# Patient Record
Sex: FEMALE | Race: BLACK OR AFRICAN AMERICAN | URBAN - METROPOLITAN AREA
[De-identification: names, ages, dates, MRNs, and addresses within clinical notes are randomized per-mention and may not be internally consistent; named-entity substitution may affect disease eponyms.]

---

## 2022-07-15 ENCOUNTER — HOSPITAL ENCOUNTER (OUTPATIENT)
Dept: LAB | Age: 32
Discharge: HOME | End: 2022-07-15
Payer: COMMERCIAL

## 2022-07-15 ENCOUNTER — OFFICE VISIT (OUTPATIENT)
Dept: SURGERY | Age: 32
End: 2022-07-15
Payer: COMMERCIAL

## 2022-07-15 VITALS
SYSTOLIC BLOOD PRESSURE: 126 MMHG | OXYGEN SATURATION: 98 % | TEMPERATURE: 97.3 F | HEART RATE: 105 BPM | RESPIRATION RATE: 24 BRPM | BODY MASS INDEX: 51.91 KG/M2 | HEIGHT: 63 IN | WEIGHT: 293 LBS | DIASTOLIC BLOOD PRESSURE: 84 MMHG

## 2022-07-15 DIAGNOSIS — E66.01 MORBID OBESITY WITH BMI OF 70 AND OVER, ADULT (HCC): Primary | ICD-10-CM

## 2022-07-15 DIAGNOSIS — E66.01 MORBID OBESITY WITH BMI OF 70 AND OVER, ADULT (HCC): ICD-10-CM

## 2022-07-15 DIAGNOSIS — E66.01 MORBID OBESITY (HCC): Primary | ICD-10-CM

## 2022-07-15 PROCEDURE — 83013 H PYLORI (C-13) BREATH: CPT

## 2022-07-15 PROCEDURE — 99205 OFFICE O/P NEW HI 60 MIN: CPT | Performed by: SURGERY

## 2022-07-15 NOTE — PROGRESS NOTES
Steffen Singh is a 27 y.o. female  Chief Complaint   Patient presents with    Advice Only     confrimed video     Pt ID confirmed    Visit Vitals  /84   Pulse (!) 105   Temp 97.3 °F (36.3 °C)   Resp 24   Ht 5' 3\" (1.6 m)   Wt (!) 514 lb (233.1 kg)   SpO2 98%   BMI 91.05 kg/m²     Ms. Han Powell has been given the following recommendations today due to her elevated BP reading: referred to Alternative/PCP   Pt ID confirmed    Weight Loss Metrics 7/15/2022 7/15/2022   Pre op / Initial Wt 514 -   Today's Wt - 514 lb   BMI - 91.05 kg/m2   Ideal Body Wt 128 -   Excess Body Wt 386 -   Goal Wt 205 -   Wt loss to date 0 -   % Wt Loss 0 -   80% .8 -       Body mass index is 91.05 kg/m². Claudia Brody

## 2022-07-16 LAB — UREA BREATH TEST QL: NEGATIVE

## 2022-07-21 ENCOUNTER — HOSPITAL ENCOUNTER (OUTPATIENT)
Dept: BARIATRICS/WEIGHT MGMT | Age: 32
Discharge: HOME | End: 2022-07-21

## 2022-07-21 ENCOUNTER — DOCUMENTATION ONLY (OUTPATIENT)
Dept: BARIATRICS/WEIGHT MGMT | Age: 32
End: 2022-07-21

## 2022-07-21 NOTE — PROGRESS NOTES
[address] [place] [address] [facility], [address]    Patient's Name: Kinga Olson   Age: 27 y.o. YOB: 1990   Sex: female    Date:   7/21/2022    Insurance:              Session: 1 of 6  Revision:     Surgeon:  Dr. Edmond Leach    Height: 5 f 3   Weight:    514      Lbs. BMI:    Pounds Lost since last month: 0                 Pounds Gained since last month: 0    Starting Weight: 514     Previous Months Weight: 514  Overall Pounds Lost: 0   Overall Pounds Gained: 0      Do you smoke? None    Alcohol intake:  Number of drinks at a time:  None  Number of times a week: None    Patient has not been to a support group. Class Guidelines    Guidelines are reviewed with patient at the start of every class. 1. Patient understands that weight loss trial classes must be consecutive. Patient understands if they miss a class, it is their responsibility to contact me to reschedule class. I will reach out to patient after their first no show. 2.  Patient understands the expectations that weight maintenance/weight loss is expected during the classes. Failure to demonstrate changes may result in one extra month of weight loss trial, followed by going back to see the surgeon. Patient understands that they CAN NOT gain any weight during the weight loss trial.  Gaining weight will result in extra classes. 3. Patient is also instructed to be doing their labs, blood work, psych visit, support group and any other test that the surgeon has used while they are working on their weight loss trial.  4.  Patient was instructed to bring their blue binder to every class and appointment. Other Pertinent Information:     Changes Made Since Last Class: Cut out carbohydrates and doing more meal planning    Eating Habits and Behaviors    Today in class, we started talking about the key diet principles. We first focused on stopping liquid calories. Patient was also educated on carbohydrates. Patient was instructed to start cutting out bread, rice, and pasta from the diet and start focusing more on meat and vegetables. I then gave a power point, which focused on Label Reading. In class, I gave patients a labels and we worked through a series of questions to help patients have a better understanding of label reading. Patient was instructed to review the serving size. Patient was encouraged to focus on protein and carbohydrates. We also did a few label reading activities to help the patient become more familiar with label reading. Patient's current diet habits include: Patient's current diet habits include: Patient is eating 1-2 meals a day. Patient states their portion sizes are regular plate. I have encouraged patient to use a smaller plate and fill up on water before meals to help cut down on portions. Patient is eating fast food: every day. Carry out intake is: 2 x a week. Sit down restaurant intake is: 2 x a week. Patient's is eating bread, rice, pasta, cereal, and other carbohydrates 2-3 x a day. Patient is snacking on chips, muffins, snack cakes. This is being done 2-3 times a day. Patient's sweet intake is every day. I have talked about the importance of getting into the habit now of cutting the sweets out. Fluid intake:  32 ounces of water, crystal light, unsweetened tea. 12 ounces of soda 1 x a week, 12 ounces of sweet tea 1 x a week,  ounces of fruit juice, 0 ounces of caffeine. Patient is encouraged not to drink calories and stick to non-carbonated, non-caffeine, sugar free drinks. The goal is 64 ounces of fluid per day. Physical Activity/Exercise    Comments: We talked about exercise. Patient was given reasons of why exercise is so important and how that can help with their long-term success. I have encouraged patient to get a support system to help with the activity.     Currently for activity, patient is not doing anything, but plans on increasing her walking. Behavior Modification       Comments:  Behavior modifications were reinforced. This included not eating in front of the TV, which could lead to bigger portions and eating when one is not hungry. We also talked about the importance of eating 3 meals per day. Patient was encouraged to food journal to keep their daily carbohydrates less than 30 grams per meal.      Goals that patient wants to work on includes:  1. Less eating out  2.  Increase movement      Manuel Keyes [address]  7/21/2022

## 2022-08-17 ENCOUNTER — DOCUMENTATION ONLY (OUTPATIENT)
Dept: BARIATRICS/WEIGHT MGMT | Age: 32
End: 2022-08-17

## 2022-08-17 ENCOUNTER — HOSPITAL ENCOUNTER (OUTPATIENT)
Dept: BARIATRICS/WEIGHT MGMT | Age: 32
Discharge: HOME | End: 2022-08-17

## 2022-08-17 NOTE — PROGRESS NOTES
[address] [place] [address] [facility], [address]    Patient's Name: Reyes Burroughs   Age: 27 y.o. YOB: 1990   Sex: female        Insurance:              Session: 2 of  6  Surgeon:  Dr. Panda Estrella    Height: 5 f 3   Current Weight:    500      Lbs. BMI:    Pounds Lost since last month: 14                 Pounds Gained since last month: 0      Starting Weight: 514     Previous Months Weight: 514  Overall Pounds Lost: 14   Overall Pounds Gained: 0    Do you smoke? Nnoe    Alcohol intake:  Number of drinks at a time:  None  Number of times a week: None    Class Guidelines    Guidelines are reviewed with patient at the start of every class. 1. Patient understands that weight loss trial classes must be consecutive. Patient understands if they miss a class, it is their responsibility to contact me to reschedule class. I will reach out to patient after their first no show. 2.  Patient understands the expectations that weight maintenance/weight loss is expected during the classes. Failure to demonstrate changes may result in one extra month of weight loss trial, followed by going back to see the surgeon. Patient understands that they CAN NOT gain any weight during the weight loss trial.  Gaining weight will result in extra classes. 3. Patient is also instructed to be doing their labs, blood work, psych visit, support group and any other test that the surgeon has used while they are working on their weight loss trial.  4.  Patient was instructed to bring their blue binder to every class and appointment. Other Pertinent Information:     Changes Made Since Last Class: Less carbohydrates. No fried food. Less fast food    Eating Habits and Behaviors    Today in class, we reviewed the key diet principles. I have talked to patient about pushing the fluid and working towards 64 ounces per day.   We focused on following a low-calorie diet.  Patient was instructed to count their carbohydrates and try to keep their daily intake under 75 grams per day and try to keep their daily protein at 80 grams per day. Patient was given examples of carbohydrates in starches. Patient was encouraged to focus on meat and vegetables and begin cutting carbohydrates out. We talked about foods that are protein-based and how to incorporate those into their meals. I also reviewed with patient the importance of eating 3 meals per day and suggestions were made for breakfast items. Patient's current diet habits include: Patient is eating 2-3 meals per day. Meals focus on: protein and vegetables. Patient is encouraged to fill up on protein first, then vegetables, and work on cutting back on the carbohydrates. Portions are: smaller. Patient is encouraged to use a smaller plate to help cut down on portion sizes. Patient is eating fast food: 1 times a week. Patient is eating carry out: 1 times a week. Patient is eating at a sit down restaurant: 1 times a week. Patient is eating bread, rice, and pasta:  1 x a month. Patient is snacking on yogurt or rice cakes, 2 x a week. Patient is eating sweets: nont. Patient is drinking 48 ounces of water, 0 ounces of soda, 0 ounces of sweet tea, 0 ounces of fruit juices, 0 ounces of caffeine. Patient is encouraged to continue to cut out liquid calories and aim for achieving 64 ounces of fluid per day. Physical Activity/Exercise    Comments: We talked about exercise. Patient was given reasons of why exercise is so important and how that can help with their long-term success. I have encouraged patient to get a support system to help with the activity. For activity, patient is sometimes walking. We have talked about goals, which include starting off walking and building upon that. Patient is also encouraged to add in some light weights to get some strength training.      Behavior Modification       Comments: During today's lesson, I gave a presentation called The 100-200 Calorie \"Mindless Margin. \"  The goal is to make modest daily 100-200 calorie reductions in certain things that the body won't notice. One, 100-200 calorie change and would will look 10-20 pounds in one year. An example could be cutting soda. Patient was given a check off list and was encouraged to come up with 1-3 100 calories changes they could make. The check off list is a daily tracker to see if these goals are being met. Goals that patient wants to work on includes:  1. Be more active  2.        Manuel Roman 87 RD  8/17/2022

## 2022-09-21 ENCOUNTER — HOSPITAL ENCOUNTER (OUTPATIENT)
Dept: BARIATRICS/WEIGHT MGMT | Age: 32
Discharge: HOME | End: 2022-09-21

## 2022-09-21 ENCOUNTER — DOCUMENTATION ONLY (OUTPATIENT)
Dept: BARIATRICS/WEIGHT MGMT | Age: 32
End: 2022-09-21

## 2022-09-21 NOTE — PROGRESS NOTES
[address] [facility], [address]    Patient's Name: Lula Vu   Age: 27 y.o. YOB: 1990   Sex: female      Insurance:              Session: 3 of  6  Surgeon:  Dr. Tania Vaca    Height: 5 f 3   Weight:    500      Lbs. BMI:    Pounds Lost since last month: 0                Pounds Gained since last month: 0    Starting Weight: 514     Previous Months Weight: 500  Overall Pounds Lost: 14   Overall Pounds Gained: 0    Smoking:  None    Alcohol intake:  Number of drinks at a time:  None  Number of times a week: None    Patient has attended the required bariatric support group. Class Guidelines    Guidelines are reviewed with patient at the start of every class. 1. Patient understands that weight loss trial classes must be consecutive. Patient understands if they miss a class, it is their responsibility to contact me to reschedule class. I will reach out to patient after their first no show. 2.  Patient understands the expectations that weight maintenance/weight loss is expected during the classes. Failure to demonstrate changes may result in one extra month of weight loss trial, followed by going back to see the surgeon. 3. Patient is also instructed to be doing their labs, blood work, psych visit, support group and any other test that the surgeon has used while they are working on their weight loss trial.    Other Pertinent Information:     Changes Made Since Last Class: Trying to eat 3 x a day    Eating Habits and Behaviors      During today's class, we continued to focus on the key diet principles. Patient was instructed to follow a low carbohydrate diet, focusing on meat and vegetables. Patient was instructed to stop liquid calories and aim for 64 ounces of water per day.  We focused on focusing in on bigger problem areas to start making changes to, such as reducing fast food intake, reducing carbonated beverages/soda intake, decreasing carbohydrates intake daily, etc. We reviewed protein shakes and high protein yogurts to chose, as well. During today's class, we also talked about how to read a label. Patient was given information on: The benefits of reading a label, which allowed one to compare the nutritional value of similar products and make healthy food decisions. The ingredient list, which can help to determine if the food is heathy or something that fits into the diet. The importance of reading the serving size and making sure to apply that to the portion size that they are consuming. Patient was also educated on carbohydrates. I talked to patient about: The function of carbohydrates. Foods that carbohydrate-heavy. Patient was given the guidelines to keep their carbohydrates less than 75 grams per day in the pre-op phase. Patient was also given ideas of low carb swaps, which include zucchini noodles, spaghetti squash, or cauliflower rice. Lower carbohydrate fruit options were discussed. Discussed lower carb swaps to use instead of potato chips. Patient's dietary habits include: Patient is eating 2.5-3 meals per day. Meals are made up of protein and vegetables. Portions are:  smaller. Patient's frequency of fast food is: 2 x a week  Patient's frequency of carry out is: None  Patient's intake of sit down: None  Patient is eating carbohydrates: once a day  Patient is snacking on pork rinds, pickles, sugar free yogurt. This is being done: 2-3 x a day. I have talked to patient about some lower carbohydrate snack choices that focused more protein. Patient is drinking 48 ounces of fluid per day. Fluid intake is make up of: water, protein shakes. Patient is encouraged to focus on non-caloric, non-caffeine, non-carbonated liquids. Physical Activity/Exercise     Comments:     Currently for exercise, patient is trying to walk more.   I have talked to patient about some suggestions to start an exercise routine. Patient is encouraged to purchase a pedometer and use this to track her steps. I have made some suggestions to patient of ways to incorporate exercise in with a busy lifestyle. We also talked about You Tube videos that can be used for an exercise routine. Behavior Modification  Comments:  Behavior modifications were discussed with the patient. Some of those behavior modifications include:  Emphasized the importance of eating slowly, not eating and drinking meals at the same time. Taking 20-30 minutes to eat a meal.  Patient is taking 20 minutes to eat a meal.  I have encouraged patient to follow journal, which may be done by paper or tracking it an maxi, such as My Fitness Pal or [employer] [place]. Patient understands the importance of following through with these behaviors following surgery to aid in long term weight loss. Tips and advice were given on how to start implementing these into the patient's life. Goal patient has set for next month:  Eliminate carbohydrates completely   2.       Manuel Gardner [address] RD  9/21/2022

## 2022-10-11 ENCOUNTER — OFFICE VISIT (OUTPATIENT)
Dept: SURGERY | Age: 32
End: 2022-10-11
Payer: COMMERCIAL

## 2022-10-11 ENCOUNTER — HOSPITAL ENCOUNTER (OUTPATIENT)
Dept: GENERAL RADIOLOGY | Age: 32
Discharge: HOME | End: 2022-10-11
Payer: COMMERCIAL

## 2022-10-11 ENCOUNTER — HOSPITAL ENCOUNTER (OUTPATIENT)
Dept: LAB | Age: 32
Discharge: HOME | End: 2022-10-11
Payer: COMMERCIAL

## 2022-10-11 VITALS
DIASTOLIC BLOOD PRESSURE: 80 MMHG | SYSTOLIC BLOOD PRESSURE: 130 MMHG | BODY MASS INDEX: 51.91 KG/M2 | HEIGHT: 63 IN | TEMPERATURE: 98.2 F | OXYGEN SATURATION: 98 % | RESPIRATION RATE: 18 BRPM | HEART RATE: 94 BPM | WEIGHT: 293 LBS

## 2022-10-11 DIAGNOSIS — E66.01 CLASS 3 SEVERE OBESITY DUE TO EXCESS CALORIES WITHOUT SERIOUS COMORBIDITY WITH BODY MASS INDEX (BMI) GREATER THAN OR EQUAL TO 70 IN ADULT (HCC): ICD-10-CM

## 2022-10-11 DIAGNOSIS — E66.01 MORBID OBESITY (HCC): ICD-10-CM

## 2022-10-11 DIAGNOSIS — Z71.89 ENCOUNTER FOR PRE-BARIATRIC SURGERY COUNSELING AND EDUCATION: Primary | ICD-10-CM

## 2022-10-11 LAB — SENTARA SPECIMEN COL,SENBCF: NORMAL

## 2022-10-11 PROCEDURE — 71046 X-RAY EXAM CHEST 2 VIEWS: CPT

## 2022-10-11 PROCEDURE — 99001 SPECIMEN HANDLING PT-LAB: CPT

## 2022-10-11 PROCEDURE — 99213 OFFICE O/P EST LOW 20 MIN: CPT | Performed by: REGISTERED NURSE

## 2022-10-11 PROCEDURE — 93005 ELECTROCARDIOGRAM TRACING: CPT

## 2022-10-11 NOTE — PROGRESS NOTES
Bariatric Preoperative Progress Note    Chief Complaint   Patient presents with    Morbid Obesity     Patient presents today for bariatric midtrial.     Subjective:     Jeromy White is a 27 y.o. female who presents today for follow up of her candidacy for bariatric surgery. Since last seen, Jeromy White has been working through our bariatric program towards gastric bypass with Dr. Aminata Palomares. Consult Weight: 514 lbs  Today's Weight: 502 lbs    Past Medical History:   Diagnosis Date    Morbid obesity ([address]        Past Surgical History:   Procedure Laterality Date    HX HEENT      adenoid removal            No Known Allergies    ROS:  Review of Systems   Constitutional:  Positive for weight loss. Respiratory:  Negative for cough and shortness of breath. Cardiovascular:  Negative for chest pain and palpitations. Gastrointestinal:  Negative for abdominal pain. Objective:     Physical Exam:  Visit Vitals  /80 (BP 1 Location: Left arm, BP Patient Position: Sitting, BP Cuff Size: Other (Comment)) Comment (BP Cuff Size): wrist cuff   Pulse 94   Temp 98.2 °F (36.8 °C) (Temporal)   Resp 18   Ht 5' 3\" (1.6 m)   Wt (!) 227.7 kg (502 lb)   SpO2 98%   BMI 88.93 kg/m²       Physical Exam  Constitutional:       Appearance: She is obese. HENT:      Head: Normocephalic and atraumatic. Eyes:      Pupils: Pupils are equal, round, and reactive to light. Cardiovascular:      Rate and Rhythm: Normal rate and regular rhythm. Pulmonary:      Effort: Pulmonary effort is normal.      Breath sounds: Normal breath sounds. Neurological:      Mental Status: She is alert and oriented to person, place, and time. Psychiatric:         Mood and Affect: Mood normal.         Behavior: Behavior normal.         Thought Content:  Thought content normal.       Studies to date:    Labs: Pending  H pylori 7/15/2022: Negative    EKG 10/11/2022: Normal sinus rhythm, Normal ECG     CXR 10/11/2022: No radiographic evidence of acute cardiopulmonary process. PAP: Pending    Nutritional evaluation: Completed 3 of 6 with Jamarcus Nathan RD    Psychiatric evaluation: Pending approval, follow up in Nov 2022 with Dr. Berto Vu: Done    Additional Evaluations:     Assessment:   iKrill Medellin is a 27 y.o. female who is progressing through the bariatric preoperative evaluation. At this time, she is an appropriate candidate for weight loss surgery pending below requirements. Plan:   -Complete remainder of preop evaluation including WLT classes, PAP, psychiatric clearance, and lab work. -Patient communicates understanding that the expectation is to lose or maintain weight during WLT. Weight gain may result in delay or cancellation of surgery.   -Follow up once she has completed entirety of weight loss workup to determine next steps. Quintella Mortimer C. Carbullido, CT-BC    I have spent 25 minutes reviewing previous notes, test results, and face to face with the patient discussing the treatment plan as well as documenting on the day of the visit.

## 2022-10-12 LAB
25(OH)D3 SERPL-MCNC: 35.6 NG/ML (ref 32–100)
A-G RATIO,AGRAT: 1.1 RATIO (ref 1.1–2.6)
ALBUMIN SERPL-MCNC: 4.1 G/DL (ref 3.5–5)
ALP SERPL-CCNC: 122 U/L (ref 25–115)
ALT SERPL-CCNC: 13 U/L (ref 5–40)
ANION GAP SERPL CALC-SCNC: 12 MMOL/L (ref 3–15)
AST SERPL W P-5'-P-CCNC: 12 U/L (ref 10–37)
ATRIAL RATE: 98 BPM
BACTERIA,BACTU: PRESENT
BILIRUB SERPL-MCNC: 0.3 MG/DL (ref 0.2–1.2)
BILIRUB UR QL: NEGATIVE
BUN SERPL-MCNC: 9 MG/DL (ref 6–22)
CA OXALATE CRYSTALS,CAOXC: PRESENT
CALCIUM SERPL-MCNC: 9.5 MG/DL (ref 8.4–10.5)
CALCULATED P AXIS, ECG09: 69 DEGREES
CALCULATED R AXIS, ECG10: 21 DEGREES
CALCULATED T AXIS, ECG11: 40 DEGREES
CHLORIDE SERPL-SCNC: 104 MMOL/L (ref 98–110)
CHOLEST SERPL-MCNC: 175 MG/DL (ref 110–200)
CLARITY: ABNORMAL
CO2 SERPL-SCNC: 25 MMOL/L (ref 20–32)
COLOR UR: YELLOW
CREAT SERPL-MCNC: 0.6 MG/DL (ref 0.5–1.2)
DIAGNOSIS, 93000: NORMAL
EPITHELIAL,EPSU: ABNORMAL /HPF (ref 0–2)
FE % SATURATION,PSAT: 7 % (ref 20–50)
FERRITIN SERPL-MCNC: 27 NG/ML (ref 10–291)
FOLATE,FOL: >20 NG/ML
GLOBULIN,GLOB: 3.6 G/DL (ref 2–4)
GLOMERULAR FILTRATION RATE: >60 ML/MIN/1.73 SQ.M.
GLUCOSE SERPL-MCNC: 86 MG/DL (ref 70–99)
GLUCOSE UR QL: NEGATIVE MG/DL
HDLC SERPL-MCNC: 38 MG/DL
HDLC SERPL-MCNC: 4.6 MG/DL (ref 0–5)
HGB UR QL STRIP: NEGATIVE
HYLINE CAST, 6014: ABNORMAL /LPF (ref 0–2)
IRON,IRN: 31 MCG/DL (ref 30–160)
KETONES UR QL STRIP.AUTO: NEGATIVE MG/DL
LDL/HDL RATIO,LDHD: 3.2
LDLC SERPL CALC-MCNC: 121 MG/DL (ref 50–99)
LEUKOCYTE ESTERASE: ABNORMAL
NITRITE UR QL STRIP.AUTO: NEGATIVE
NON-HDL CHOLESTEROL, 011976: 137 MG/DL
P-R INTERVAL, ECG05: 130 MS
PH UR STRIP: 5.5 PH (ref 5–8)
POTASSIUM SERPL-SCNC: 4.4 MMOL/L (ref 3.5–5.5)
PROT SERPL-MCNC: 7.7 G/DL (ref 6.4–8.3)
PROT UR QL STRIP: NEGATIVE MG/DL
Q-T INTERVAL, ECG07: 354 MS
QRS DURATION, ECG06: 78 MS
QTC CALCULATION (BEZET), ECG08: 451 MS
RBC #/AREA URNS HPF: ABNORMAL /HPF
SODIUM SERPL-SCNC: 141 MMOL/L (ref 133–145)
SP GR UR: 1.02 (ref 1–1.03)
TIBC,TIBC: 433 MCG/DL (ref 228–428)
TRIGL SERPL-MCNC: 81 MG/DL (ref 40–149)
TSH SERPL DL<=0.005 MIU/L-ACNC: 2.43 MCU/ML (ref 0.27–4.2)
UIBC SERPL-MCNC: 402 MCG/DL (ref 110–370)
UROBILINOGEN UR STRIP-MCNC: 0.2 MG/DL
VENTRICULAR RATE, ECG03: 98 BPM
VIT B12 SERPL-MCNC: >2000 PG/ML (ref 211–911)
VLDLC SERPL CALC-MCNC: 16 MG/DL (ref 8–30)
WBC URNS QL MICRO: ABNORMAL /HPF (ref 0–5)

## 2022-10-28 ENCOUNTER — DOCUMENTATION ONLY (OUTPATIENT)
Dept: BARIATRICS/WEIGHT MGMT | Age: 32
End: 2022-10-28

## 2022-10-28 ENCOUNTER — HOSPITAL ENCOUNTER (OUTPATIENT)
Dept: BARIATRICS/WEIGHT MGMT | Age: 32
Discharge: HOME | End: 2022-10-28

## 2022-10-28 NOTE — PROGRESS NOTES
[address] [facility], [address]    Patient's Name: Nova Toribio   Age: 27 y.o. YOB: 1990   Sex: female      Insurance:              Session: 4 of  6  Surgeon:  Dr. Wojciech Retana    Height: 5 f 3   Weight:    502      Lbs. BMI:    Pounds Lost since last month: 0                 Pounds Gained since last month: 2    Starting Weight: 514     Previous Months Weight: 500  Overall Pounds Lost: 12   Overall Pounds Gained: 0    Patient has attended a support group meeting. Do you smoke? None    Alcohol intake:  Number of drinks at a time:  None  Number of times a week: None    Class Guidelines    Guidelines are reviewed with patient at the start of every class. 1. Patient understands that weight loss trial classes must be consecutive. Patient understands if they miss a class, it is their responsibility to contact me to reschedule class. I will reach out to patient after their first no show. 2.  Patient understands the expectations that weight maintenance/weight loss is expected during the classes. Failure to demonstrate changes may result in one extra month of weight loss trial, followed by going back to see the surgeon. 3. Patient is also instructed to be doing their labs, blood work, psych visit, support group and any other test that the surgeon has used while they are working on their weight loss trial.  4. Patient is instructed to bring their education binder to all classes. Changes Made Since Last Class: Doing better at eating 3 meals per day    Eating Habits and Behaviors      Today we reviewed key diet principles. We talked about patient following a low calorie/low carbohydrate diet while they are in weight loss trials. To achieve this, patient is encouraged to avoid liquid calories, including alcohol, soda, sweet tea, and fruit juices.    Patient can cut carbohydrates by trying to stick to meat and vegetables. Patient can also eat eggs, cheese, and good fat, while trying to eliminate starches, such as pasta, rice, crackers, chips, popcorn. I also gave a power point that included 21 Ways to Stay on Track with a Healthy Lifestyle. Some of the food-related suggestions included drinking plenty of water or calorie-free beverages prior to their meal.  Patient is encouraged to to fill up on protein first, which is the ultimate fill-me up food. We talked about the importance of eating breakfast and the effects that can happen if one skips meals, which includes eating larger portions, snacking more, and decreasing their metabolism. With the suggestions in the power point, patient will be able to decrease their calories and carbohydrate intake. Patient's dietary habits include:    - Patient is eating 3 meals per day. I have emphasized the importance of trying to eat within 1 hour of waking and having a cut off time in the evening. Addressed to patient that the focus should be on protein and vegetables. Protein will help to burn more calories and keep them mathur throughout the day. We talked in class about the importance of planning ahead and trying to do more meal prep at home, so intake of eating out can be decreased. Patient is eating carbohydrates: few times a week  Patient was instructed to cut out starches and keep under 75 grams of carbohydrates per day. Reviewed what portions of carbohydrates are  Patient is snacking on pickles. This is being done: 3 x  a day. I have talked to patient about some lower carbohydrate snack choices that focused more protein. Patient's sweet intake is: 1 x every other week. We talked about label reading and cutting out simple sugar. Fluid intake is make up of: water. I have encouraged patient to cut out liquid calories and focus on non-caloric, non-carbonated drinks. Physical Activity/Exercise    Comments:    We talked about the importance of establishing a work out routine. Patient is currently not doing anything for activity. Goals have been set. Behavior Modification       Comments:   Some of the behavior tips that were included in the power point, include being choosy about night time snacking. Patient was encouraged to make the TV a no eating zone and not eat after 7 pm.  Patient is also encouraged to keep a food journal.      One of the other things we talked about during class is whether or not patient has a support system. Goals set by Registered Dietitian:  Add exercise  No eating out.     Elaine Lomeli, MS RD  October 20, 2022

## 2022-11-18 ENCOUNTER — DOCUMENTATION ONLY (OUTPATIENT)
Dept: BARIATRICS/WEIGHT MGMT | Age: 32
End: 2022-11-18

## 2022-11-18 ENCOUNTER — HOSPITAL ENCOUNTER (OUTPATIENT)
Dept: BARIATRICS/WEIGHT MGMT | Age: 32
Discharge: HOME | End: 2022-11-18

## 2022-11-18 NOTE — PROGRESS NOTES
[address] [place] [address] [facility], [address]    Patient's Name: Solange Galvez   Age: 34 y.o. YOB: 1990   Sex: female                Session: 11 of 6  Surgeon:  Dr. Miriam Luis    Height: 5 f 3   Weight:    505      Lbs. BMI:    Pounds Lost since last month: 0                 Pounds Gained since last month: 3    Starting Weight: 514     Previous Months Weight: 502  Overall Pounds Lost: 9   Overall Pounds Gained: 0      Do you smoke? None    Alcohol intake:  Number of drinks at a time:  None  Number of times a week: None    Class Guidelines    Guidelines are reviewed with patient at the start of every class. 1. Patient understands that weight loss trial classes must be consecutive. Patient understands if they miss a class, it is their responsibility to contact me to reschedule class. I will reach out to patient after their first no show. 2.  Patient understands the expectations that weight maintenance/weight loss is expected during the classes. Failure to demonstrate changes may result in one extra month of weight loss trial, followed by going back to see the surgeon. 3. Patient is also instructed to be doing their labs, blood work, psych visit, support group and any other test that the surgeon has used while they are working on their weight loss trial.    Other Pertinent Information:   Patient has been to a support group meeting. Changes Made Since Last Class: none      Dietary Instruction    During today's class we continued to focus on the key diet principles. Patient was instructed to follow a low carbohydrate diet, focusing on meat and vegetables. Patient was instructed to stop liquid calories and aim for 64 ounces of water per day. In class, I also gave patient a power point on surviving the holidays.   Some of the tips included survival tips for parties, including bringing their own low carbohydrate dish to a potluck dinner and surveying the buffet line before they start filling up their plate. Patient was given cooking alternatives, including using Splenda for sugar, substituting applesauce for oil in recipes, and using low fat plain yogurt instead of sour cream in dips. Patient was also encouraged to be mindful of calories in alcohol. Other things that we talked about in class include eating 3 meals per day. We talked about the importance of trying to eat within 1 hour of waking and having a cut off time in the evening. Addressed to patient that the focus should be on protein and vegetables. Protein will help to burn more calories and keep them mathur throughout the day. I have encouraged patient to use a smaller plate to measure their portions. We talked in class about the importance of planning ahead and trying to do more meal prep at home, so intake of eating out can be decreased. Patient was instructed to cut out starches and keep under 75 grams of carbohydrates per day. Reviewed what portions of carbohydrates are. We also talked about lower carbohydrate snack choices that focus on protein. We talked about label reading and cutting out simple sugar. Patient is encouraged to aim for 64 ounces of fluid per day and cut out liquid calories. Physical Activity/Exercise    Patient is currently doing walking some for activity. Today's power point on surviving the holidays also included tips on exercising. This included being creative during the holiday, walking stairs, mall walking, getting resistance bands. Patient was encouraged not to be afraid to excuse themselves from the table to go for a walk after they eat. Behavior Modification    Reinforced behavior changes to make. Patient was encouraged to keep their emotions in check. Try to HALT and focus on whether they are eating out of hunger or if they are eating out of emotions.   Other eating behaviors included surveying the buffet line before starting to fill up their plate.   Patient was given a check off list and encouraged to monitor some of their eating behaviors, such as eating slowly, chewing their food thoroughly, and taking 20-30 minutes to eat a meal.        Non-Scale that patient set for next month include:  Exercise more  Smaller portions      Chris Vargas MS RD  11/18/2022

## 2022-12-15 ENCOUNTER — DOCUMENTATION ONLY (OUTPATIENT)
Dept: BARIATRICS/WEIGHT MGMT | Age: 32
End: 2022-12-15

## 2022-12-15 ENCOUNTER — HOSPITAL ENCOUNTER (OUTPATIENT)
Dept: BARIATRICS/WEIGHT MGMT | Age: 32
Discharge: HOME | End: 2022-12-15

## 2022-12-15 NOTE — PROGRESS NOTES
[address] [place] [address] [facility], [address]    Patient's Name: Faye Ruiz   Age: 34 y.o. YOB: 1990   Sex: female        Session: 10 of  6  Surgeon:  Radha Rogers, formerly Dr. Galeas    Height: 5  f3   Weight:    523      Lbs. BMI:    Pounds Lost since last month: 0                Pounds Gained since last month: 19    Starting Weight: 514     Previous Months Weight: 505  Overall Pounds Lost: 0   Overall Pounds Gained: 9      Do you smoke? None    Alcohol intake:  Number of drinks at a time:  NOne  Number of times a week: NOne    Class Guidelines    Guidelines are reviewed with patient at the start of every class. 1. Patient understands that weight loss trial classes must be consecutive. Patient understands if they miss a class, it is their responsibility to contact me to reschedule class. I will reach out to patient after their first no show. 2.  Patient understands the expectations that weight maintenance/weight loss is expected during the classes. Failure to demonstrate changes may result in one extra month of weight loss trial, followed by going back to see the surgeon. 3. Patient is also instructed to be doing their labs, blood work, psych visit, support group and any other test that the surgeon has used while they are working on their weight loss trial.    Other Pertinent Information:     Patient has attended support group. Changes Made Since Last Class:     Eating Habits and Behaviors      Today we reviewed key diet principles. We talked about protein drinks and ones that would be okay. Patient was encouraged to start getting into a routine now and drinking a shake. Patient may use for a meal replacement or a snack. Patient was also encouraged to stop liquid calories. We talked about fluid choices that would be okay. We also spent a lot of time talking about carbohydrates.   Patient was encouraged to start cutting their carbohydrates out and keep them less than 75 grams per day. Patient was given examples of carbohydrates that are in food. We also talked about the power of protein and the importance of getting more protein in per day than carbohydrates. I have reviewed with patient meal and snack ideas that focus on protein first.    I also reviewed with patient the vitamins that they will need to take post op. Patient will hear this information again at pre op class prior to surgery, but I felt it was important to prepare them now. Patient will be taking 2 multivitamin complete per day, 100 mg of Vitamin B1, 5000 IU of Vitamin D3, 1000 mcg Vitamin B12, 1500 mg of calcium citrate. We also spent some time talking about the post op diet protocol. Patient is aware they will be on a liquid diet before surgery and then a week of liquids after surgery followed by 5 weeks of soft protein. Patient's diet habits are: 3 meals per day. Mixture of protein, vegetables, and carbohydrates. Snacking on pickles and eggs. May have sweets two times a week. States she has only had 3 sodas in the last month. Physical Activity/Exercise    Comments:     Currently for exercise, patient is doing some cardio. We talked about activities for patient to do, including walking, swimming, or chair exercises. Goals have been set. Behavior Modification       Comments:  Emphasized the importance of eating slowly, not eating and drinking meals at the same time. I have also encouraged patient to work on food journaling  We talked about ways they can track their daily intake. Goals that patient set for next month include:  Be more active. Better portions. Patient has completed her weight loss trial, but has gained weight and has been scheduled to see me again on January 19. If her weight is still up, she will see the NP.     Manuel Hernández 87 RD  12/15/2022

## 2023-01-19 ENCOUNTER — DOCUMENTATION ONLY (OUTPATIENT)
Dept: BARIATRICS/WEIGHT MGMT | Age: 33
End: 2023-01-19

## 2023-01-19 ENCOUNTER — HOSPITAL ENCOUNTER (OUTPATIENT)
Dept: BARIATRICS/WEIGHT MGMT | Age: 33
Discharge: HOME | End: 2023-01-19

## 2023-01-19 NOTE — PROGRESS NOTES
[address] [place] [address] [facility], [address]    Patient's Name: Henrik Caban   Age: 34 y.o. YOB: 1990   Sex: female    Date:   1/19/2023        Session: 7 of 6  Revision:     Surgeon:  Dr. Suzanne Llanos    Height: 5 f 3   Weight:    514      Lbs. BMI:    Pounds Lost since last month: 9                 Pounds Gained since last month: 0    Starting Weight: 514     Previous Months Weight: 523  Overall Pounds Lost: 0   Overall Pounds Gained: 0    Do you smoke? None    Alcohol intake:  Number of drinks at a time:  NOne  Number of times a week: None    Class Guidelines    Guidelines are reviewed with patient at the start of every class. 1. Patient understands that weight loss trial classes must be consecutive. Patient understands if they miss a class, it is their responsibility to contact me to reschedule class. I will reach out to patient after their first no show. 2.  Patient understands the expectations that weight maintenance/weight loss is expected during the classes. Failure to demonstrate changes may result in one extra month of weight loss trial, followed by going back to see the surgeon. 3. Patient is also instructed to be doing their labs, blood work, psych visit, support group and any other test that the surgeon has used while they are working on their weight loss trial.    Other Pertinent Information:     Patient has attended a support group meeting. Changes Made Since Last Class: Eating less. Keeping small healthy snacks on hand    Eating Habits and Behaviors      Today we reviewed key diet principles. We talked about snack ideas that would focus more on protein. We also talked about the benefits of filling up on protein first and keeping the daily carbohydrate intake to less than 75 grams per day.   Patient was instructed to increase fluid intake to 64 ounces per day and stop all carbonation, caffeine, and sugary drinks. During class, we talked about the importance of getting on a routine of eating 3 meals a day, eating within one hour of waking up, and not going longer than 4 hours without fueling the body again. I also talked with patient about some meal ideas. Patient is currently eating 3 meals per day. Meals focus on protein, vegetables, and carbohydrates. Patient is encouraged to work on cutting starches out. Patient is encouraged to continue to work on getting 64 ounces of fluid per day. Patient is currently drinking 64 ounces of water. Patient has been instructed to stop all liquid calories. We talked about snacks in class that are protein based and cutting out the empty carbohydrates. Patient was also given ideas of different protein shake that could be used as a snack or meal replacement. Physical Activity/Exercise    Comments:     Currently for exercise, patient is monitoring her movement and working on getting more steps in. We talked about activities for patient to do, including walking, swimming, or chair exercises. Goals have been set. Behavior Modification       Comments:   During class, I reviewed a power point with patients called, \"Assessing Your Readiness to Change. \"  During this power point, patient was asked to self-evaluate themselves. At the end, we tallied the scores to determine how ready they are to make changes for the surgery. For the New Year's, I had patient set New Year's resolutions, including a food-related goal, exercise-related goal, and behavior goal.  Patient was encouraged to track the goals on a daily basis using the check off list I provided. Goals should be SMART, specific, measurable, attainable, realistic, and time-orientated. Patient's Goals are:  1. Behavior-Related Goal: Stop doing a lot of sitting work     2. Food-related goal: Cut out sugary snacks completely in the next 30 days    3.  Exercise-related goal: Increase by 500 steps in the next 30 days. Patient will be scheduled to see the NP to help her get down to 450 pounds.         Manuel Pollard Natalio 87 RD  1/19/2023

## 2023-05-18 ENCOUNTER — NEW PATIENT (OUTPATIENT)
Dept: URBAN - METROPOLITAN AREA CLINIC 1 | Facility: CLINIC | Age: 33
End: 2023-05-18

## 2023-05-18 DIAGNOSIS — H52.12: ICD-10-CM

## 2023-05-18 DIAGNOSIS — H52.223: ICD-10-CM

## 2023-05-18 DIAGNOSIS — H52.11: ICD-10-CM

## 2023-05-18 DIAGNOSIS — Z01.00: ICD-10-CM

## 2023-05-18 PROCEDURE — 92015 DETERMINE REFRACTIVE STATE: CPT

## 2023-05-18 PROCEDURE — 92004 COMPRE OPH EXAM NEW PT 1/>: CPT

## 2023-05-18 ASSESSMENT — VISUAL ACUITY
OS_SC: 20/30-1
OD_SC: J7
OD_PH: 20/25
OS_SC: J1
OD_SC: 20/150+1

## 2023-05-18 ASSESSMENT — TONOMETRY
OS_IOP_MMHG: 14
OD_IOP_MMHG: 16

## 2023-06-05 ENCOUNTER — CONTACT LENSES/GLASSES VISIT (OUTPATIENT)
Dept: URBAN - METROPOLITAN AREA CLINIC 2 | Facility: CLINIC | Age: 33
End: 2023-06-05

## 2023-06-05 DIAGNOSIS — H18.613: ICD-10-CM

## 2023-06-05 PROCEDURE — 92072 FITG C-LENS KERATOCONUS 1ST: CPT

## 2023-06-05 ASSESSMENT — KERATOMETRY
OD_AXISANGLE_DEGREES: 003
OD_K1POWER_DIOPTERS: 53.00
OS_K1POWER_DIOPTERS: 44.00
OS_AXISANGLE2_DEGREES: 12
OS_AXISANGLE_DEGREES: 102
OD_AXISANGLE2_DEGREES: 93
OD_K2POWER_DIOPTERS: 60.00
OS_K2POWER_DIOPTERS: 47.50

## 2023-06-05 ASSESSMENT — VISUAL ACUITY
OD_CC: 20/40
OS_CC: 20/100
OD_PH: 20/50

## 2023-06-26 ENCOUNTER — CONTACT LENSES/GLASSES VISIT (OUTPATIENT)
Dept: URBAN - METROPOLITAN AREA CLINIC 2 | Facility: CLINIC | Age: 33
End: 2023-06-26

## 2023-06-26 DIAGNOSIS — H18.613: ICD-10-CM

## 2023-06-26 PROCEDURE — 92310-F CONTACT LENS FITTING FOLLOW UP

## 2023-06-26 ASSESSMENT — KERATOMETRY
OS_AXISANGLE_DEGREES: 102
OS_K2POWER_DIOPTERS: 47.50
OS_K1POWER_DIOPTERS: 44.00
OD_K1POWER_DIOPTERS: 53.00
OD_AXISANGLE_DEGREES: 003
OD_K2POWER_DIOPTERS: 60.00
OS_AXISANGLE2_DEGREES: 12
OD_AXISANGLE2_DEGREES: 93

## 2023-06-27 ASSESSMENT — VISUAL ACUITY
OS_CC: 20/30-1
OD_CC: 20/30

## 2025-03-18 NOTE — PROGRESS NOTES
Consult    Patient: Neva Ladd MRN: [id number]  SSN: [id number]    YOB: 1990  Age: 27 y.o. Sex: female      Initial  Consultation for Bariatric Surgery     Neva Ladd is a 76-year-old black female presents for discussion surgical options available for definitive management of her super, super, super, super, superobesity. Onset obesity: Childhood  Weight at age 24: 300 pounds on a 5 foot 3 inch frame  Maximum/current weight: 514 pounds on a 5 foot 3 inch frame with a body mass index of 91  Pattern/progression of weight gain: Slowly progressive interrupted by dietary weight loss followed by regain of lost weight as well as additional weight thus exhibiting yoyo effect after current maximum weight of 514 pounds  Max medical weight loss attempts: Multiple unsupervised and supervised weight loss trials of maximal loss of 30 pounds occurring in 2020 over 4 months  Comorbidities: Clinical obstructive sleep apnea, weight related arthropathy-back  Current weight: 514. BMI: 91  Ideal body weight: 28  Excess body weight: 386  Estimated postsurgical weight loss based on 8% loss of excess body weight: 308  Postsurgical goal weight: 205  Allergies: No known drug allergies  Current medications: See medication list  Past medical history:  1. Super, super, super, super, super, obesity with a body mass index of 91 and obesity related comorbidities of clinical obstructive sleep apnea likely due to arthropathy-back  Past surgical history:  1. Adenoidectomy-childhood  Social history: Denies utilization for tobacco and alcohol  Family history: Mother 52-healthy  Father 57-healthy  Brother 29-clinically severe obesity      No Known Allergies    No current outpatient medications on file prior to visit. No current facility-administered medications on file prior to visit. History reviewed. No pertinent past medical history.     Past Surgical History:   Procedure Laterality Date    HX HEENT Refill request for     pregabalin (LYRICA) 200 MG capsule ()    medication.     Name of [facility]acy- [facility]      Last visit - 979183     Pending visit - 430    Last refill -148424      Medication Contract signed -  Last Oarrs ran-         Additional Comments    adenoid removal        Social History     Tobacco Use    Smoking status: Never Smoker    Smokeless tobacco: Never Used   Substance Use Topics    Alcohol use: Never    Drug use: Never       No family history on file. Review of Systems:      General: Denies fevers, chills, night sweats, fatigue, weight loss, or weight gain. HEENT: Denies changes in auditory or visual acuity, recurrent pharyngitis, epistaxis, chronic rhinorrhea, vertigo    Respiratory: Denies increasing shortness of breath, productive cough, hemoptysis    Cardiac: Denies known history of cardiac disease, heart murmur, palpitations    GI: Denies dysphagia, recurrent emesis, hematemesis, changes in bowel habits, hematochezia, melena    : Denies hematuria frequency urgency dysuria    Musculoskeletal: Denies fractures, dislocations    Neurologic: Denies history of CVA, paralysis paresthesias, recurrent cephalgia, seizures    Endocrine: Denies polyuria, polydipsia, polyphagia, heat and cold intolerance    Lymph/heme: Denies a history of malignancy, anemia, bruising, blood transfusions    Integumentary: Negative for dermatitis         Physical Exam    Visit Vitals  /84   Pulse (!) 105   Temp 97.3 °F (36.3 °C)   Resp 24   Ht 5' 3\" (1.6 m)   Wt (!) 233.1 kg (514 lb)   SpO2 98%   BMI 91.05 kg/m²       Nursing note reviewed. General: Super, super, super, super, super, obese 27-year-old black female in no acute distress, nontoxic in appearance. Head: Normocephalic, atraumatic  Mouth: Clear, no overt lesions, oral mucosa is pink and moist.  Neck: Supple, no masses, no adenopathy or carotid bruits, trachea midline  Resp: Clear to auscultation bilaterally, no wheezing, rhonchi, or rales, excursions normal and symmetrical.  Cardio: Regular rate and rhythm, no murmurs, clicks, gallops, or rubs. Abdomen: Obese, soft, nontender, nondistended, normoactive bowel sounds, no hernias.   Extremities: Warm, well perfused, no tenderness or swelling, normal gait/station, without edema or varicosities  Neuro: Sensation and strength grossly intact and symmetrical.  Psych: Alert and oriented to person, place, and time. Impression/Plan:    24-year-old black female with a body mass index of 91 with obesity related comorbidities of clinical obstructive sleep apnea and weight related arthropathy who would benefit from bariatric surgery. We have had an extensive discussion with regard to the risks, benefits and likely outcomes of the operation. We've discussed the restrictive and malabsorptive nature of the gastric bypass and compared and contrasted with the sleeve gastrectomy. The patient understands the likelihood of losing approximately 80% of their excess weight in 12 to 18 months. The patient also understands the risks including but not limited to bleeding, infection, need for reoperation, ulcers, leaks and strictures, bowel obstruction secondary to adhesions and internal hernias, DVT, PE, heart attack, stroke, and death. Patient also understands risks of inadequate weight loss, excess weight loss, vitamin insufficiency, protein malnutrition, excess skin, and loss of hair. We have reviewed the components of a successful postoperative course including requirement for a high protein, low carbohydrate diet, 60 oz a day of zero calorie liquids, daily vitamin supplementation, daily exercise, regular follow-up, and participation in support groups.  At this time we will enroll the patient in our bariatric program, undertake routine laboratory evaluation, chest X-ray, EKG, possible UGI and evaluation by  nutritionist as well as psychologist and pending their satisfactory completion of the preop evaluation, plan to pursue laparoscopic potentially open gastric bypass to achieve definitive durable weight loss on a personal level with expected resolution of obesity related comorbidities